# Patient Record
Sex: FEMALE | Race: WHITE | Employment: FULL TIME | URBAN - METROPOLITAN AREA
[De-identification: names, ages, dates, MRNs, and addresses within clinical notes are randomized per-mention and may not be internally consistent; named-entity substitution may affect disease eponyms.]

---

## 2017-01-22 ENCOUNTER — HOSPITAL ENCOUNTER (OUTPATIENT)
Age: 47
Discharge: HOME | End: 2017-01-22
Attending: EMERGENCY MEDICINE
Payer: COMMERCIAL

## 2017-01-22 VITALS
OXYGEN SATURATION: 100 % | RESPIRATION RATE: 18 BRPM | TEMPERATURE: 98 F | HEIGHT: 66 IN | DIASTOLIC BLOOD PRESSURE: 102 MMHG | WEIGHT: 170 LBS | HEART RATE: 73 BPM | SYSTOLIC BLOOD PRESSURE: 148 MMHG | BODY MASS INDEX: 27.32 KG/M2

## 2017-01-22 DIAGNOSIS — J01.01 ACUTE RECURRENT MAXILLARY SINUSITIS: Primary | ICD-10-CM

## 2017-01-22 DIAGNOSIS — H92.22 EAR BLEEDING, LEFT: ICD-10-CM

## 2017-01-22 PROCEDURE — 99213 OFFICE O/P EST LOW 20 MIN: CPT

## 2017-01-22 PROCEDURE — 99204 OFFICE O/P NEW MOD 45 MIN: CPT

## 2017-01-22 RX ORDER — AMOXICILLIN AND CLAVULANATE POTASSIUM 875; 125 MG/1; MG/1
1 TABLET, FILM COATED ORAL 2 TIMES DAILY
Qty: 20 TABLET | Refills: 0 | Status: SHIPPED | OUTPATIENT
Start: 2017-01-22 | End: 2017-02-01

## 2017-01-22 RX ORDER — NEOMYCIN SULFATE, POLYMYXIN B SULFATE, HYDROCORTISONE 3.5; 10000; 1 MG/ML; [USP'U]/ML; MG/ML
4 SOLUTION/ DROPS AURICULAR (OTIC)
Qty: 10 ML | Refills: 0 | Status: SHIPPED | OUTPATIENT
Start: 2017-01-22 | End: 2017-01-29

## 2017-01-22 NOTE — ED INITIAL ASSESSMENT (HPI)
C/o left ear bleeding this am  Has had uri sx x 1 week   Head and nasal congestion   Some facial pain

## 2017-01-22 NOTE — ED PROVIDER NOTES
Patient Seen in: [facility] In [address] [place] [address]    History   Patient presents with:  Cough/URI    Stated Complaint: ear bleeding    HPI    42year old female with past medical history of hypothyroidism, recurrent sinusitis s/p right sinus s needed. Apply Ointment to area or rash 2-3 times/day after washing the area. Mometasone Furoate (NASONEX) 50 MCG/ACT Nasal Suspension,  1 spray by Nasal route daily as needed.        Family History   Problem Relation Age of Onset   • Hypertension Mother and reactive to light. Neck: Normal range of motion. Neck supple. Cardiovascular: Normal rate and intact distal pulses. Pulmonary/Chest: Effort normal. No respiratory distress. Musculoskeletal: Normal range of motion. She exhibits no tenderness.

## 2017-03-21 ENCOUNTER — OFFICE VISIT (OUTPATIENT)
Dept: INTERNAL MEDICINE CLINIC | Facility: CLINIC | Age: 47
End: 2017-03-21

## 2017-03-21 VITALS
HEIGHT: 66 IN | SYSTOLIC BLOOD PRESSURE: 120 MMHG | DIASTOLIC BLOOD PRESSURE: 86 MMHG | HEART RATE: 76 BPM | WEIGHT: 175 LBS | BODY MASS INDEX: 28.13 KG/M2 | TEMPERATURE: 98 F

## 2017-03-21 DIAGNOSIS — J01.00 ACUTE NON-RECURRENT MAXILLARY SINUSITIS: Primary | ICD-10-CM

## 2017-03-21 DIAGNOSIS — J06.9 VIRAL UPPER RESPIRATORY TRACT INFECTION: ICD-10-CM

## 2017-03-21 PROCEDURE — 99214 OFFICE O/P EST MOD 30 MIN: CPT | Performed by: INTERNAL MEDICINE

## 2017-03-21 PROCEDURE — 99212 OFFICE O/P EST SF 10 MIN: CPT | Performed by: INTERNAL MEDICINE

## 2017-03-21 RX ORDER — AZITHROMYCIN 250 MG/1
TABLET, FILM COATED ORAL
Qty: 6 TABLET | Refills: 0 | Status: SHIPPED | OUTPATIENT
Start: 2017-03-21 | End: 2017-09-01

## 2017-03-21 NOTE — PROGRESS NOTES
HPI:   Jane Hrenandez is a 41year old female who presents for complains of: Patient presents with:  Cough: since saturday started with cough, now head congestion also , low grade 99.5, 2 days now, sick contact at home, head congestion and body aches, i infection  As above, call if you are not getting better with the plan above.         Livan Cervantes,   3/21/2017  11:58 AM

## 2017-03-21 NOTE — PATIENT INSTRUCTIONS
1. Acute non-recurrent maxillary sinusitis  Use the abx if getting worse  Use either Mucinex D, Robitussin D to help with his symptoms over the next few days along with something to help with fever such as ibuprofen or Tylenol.  - azithromycin (ZITHROMAX Z

## 2017-04-01 ENCOUNTER — PATIENT MESSAGE (OUTPATIENT)
Dept: INTERNAL MEDICINE CLINIC | Facility: CLINIC | Age: 47
End: 2017-04-01

## 2017-04-02 NOTE — TELEPHONE ENCOUNTER
From: Adalberto Cai  To: Nicolette Mejia DO  Sent: 4/1/2017 5:09 PM CDT  Subject: Other    Hi Dr Alan Olivera!  Sanaz Shine has finally agreed to coming to see you! We want to come Friday April 14th. ...he wants to do any blood work etc there at your office

## 2017-05-05 DIAGNOSIS — E03.1 CONGENITAL HYPOTHYROIDISM WITHOUT GOITER: Primary | ICD-10-CM

## 2017-05-05 NOTE — TELEPHONE ENCOUNTER
Last refilled 11/18/16 for a year supply to [facility]. Does pt want Rx sent to [facility] now?  LMTCB

## 2017-05-05 NOTE — TELEPHONE ENCOUNTER
Pt doesn't need a refill at this time. She was trying to transfer the prescription from [facility] to [address] [place] [address], so they have it when she needs the refill.                    Tasked to rx

## 2017-05-10 RX ORDER — LEVOTHYROXINE SODIUM 0.05 MG/1
50 TABLET ORAL
Qty: 90 TABLET | Refills: 3 | OUTPATIENT
Start: 2017-05-10

## 2017-08-07 PROBLEM — R60.0 LEG EDEMA, LEFT: Status: ACTIVE | Noted: 2017-08-07

## 2017-09-01 PROCEDURE — 87480 CANDIDA DNA DIR PROBE: CPT | Performed by: OBSTETRICS & GYNECOLOGY

## 2017-09-01 PROCEDURE — 87660 TRICHOMONAS VAGIN DIR PROBE: CPT | Performed by: OBSTETRICS & GYNECOLOGY

## 2017-09-01 PROCEDURE — 87510 GARDNER VAG DNA DIR PROBE: CPT | Performed by: OBSTETRICS & GYNECOLOGY

## 2017-09-06 ENCOUNTER — PATIENT MESSAGE (OUTPATIENT)
Dept: INTERNAL MEDICINE CLINIC | Facility: CLINIC | Age: 47
End: 2017-09-06

## 2017-09-06 NOTE — TELEPHONE ENCOUNTER
From: Balta Cai  To: Zaid Lloyd DO  Sent: 9/6/2017 10:47 AM CDT  Subject: Prescription Question    Dr. Suhail Caceres -   In the past, I have had my Rx for my thyroid filled at the local pharmacy.  I have enough left for now until I see you aga

## 2017-11-10 ENCOUNTER — OFFICE VISIT (OUTPATIENT)
Dept: INTERNAL MEDICINE CLINIC | Facility: CLINIC | Age: 47
End: 2017-11-10

## 2017-11-10 VITALS
TEMPERATURE: 98 F | HEIGHT: 65 IN | DIASTOLIC BLOOD PRESSURE: 86 MMHG | BODY MASS INDEX: 29.89 KG/M2 | HEART RATE: 79 BPM | OXYGEN SATURATION: 98 % | WEIGHT: 179.38 LBS | SYSTOLIC BLOOD PRESSURE: 110 MMHG

## 2017-11-10 DIAGNOSIS — R60.0 LEG EDEMA, LEFT: ICD-10-CM

## 2017-11-10 DIAGNOSIS — I83.90 UNCOMPLICATED VARICOSE VEINS: ICD-10-CM

## 2017-11-10 DIAGNOSIS — Z85.820 HISTORY OF MELANOMA: ICD-10-CM

## 2017-11-10 DIAGNOSIS — Z00.00 PHYSICAL EXAM: Primary | ICD-10-CM

## 2017-11-10 DIAGNOSIS — L82.1 SEBORRHEIC KERATOSIS: ICD-10-CM

## 2017-11-10 DIAGNOSIS — E03.1 CONGENITAL HYPOTHYROIDISM WITHOUT GOITER: ICD-10-CM

## 2017-11-10 DIAGNOSIS — Z85.828 HISTORY OF BASAL CELL CARCINOMA: ICD-10-CM

## 2017-11-10 PROCEDURE — 99214 OFFICE O/P EST MOD 30 MIN: CPT | Performed by: INTERNAL MEDICINE

## 2017-11-10 PROCEDURE — 99212 OFFICE O/P EST SF 10 MIN: CPT | Performed by: INTERNAL MEDICINE

## 2017-11-10 RX ORDER — LEVOTHYROXINE SODIUM 0.05 MG/1
50 TABLET ORAL
Qty: 90 TABLET | Refills: 3 | Status: SHIPPED | OUTPATIENT
Start: 2017-11-10 | End: 2018-10-18

## 2017-11-10 NOTE — PATIENT INSTRUCTIONS
1. Physical exam  Physical exam instruction: Improve diet and exercise, cont labs thru employer. 2. Uncomplicated varicose veins  Cont current treatments    3. Seborrheic keratosis  Cont current management    4.  Congenital hypothyroidism without goiter

## 2017-11-10 NOTE — PROGRESS NOTES
HPI:   Shelli Andrade is a 41year old female who presents for a complete physical exam.     Patient complains of: Patient presents with:  Physical: Brought lab work done through work, stable and doing well, exercising 5 times per week, cardio 30 min pe History   Problem Relation Age of Onset   • Hypertension Mother 54   • Stroke Father 61   • Hypertension Maternal Grandmother       Social History:   Smoking status: Never Smoker                                                              Smokeless tobacc cyanosis no edema  Skin exam: No obvious wounds, no rashes  Neurological exam: Cranial nerves II through XII intact, no gross deficits  Musculoskeletal exam: no arthritis appreciated, no obvious deformity    ASSESSMENT AND PLAN:   Rian Gregory is a 41

## 2017-12-07 ENCOUNTER — TELEPHONE (OUTPATIENT)
Dept: INTERNAL MEDICINE CLINIC | Facility: CLINIC | Age: 47
End: 2017-12-07

## 2017-12-08 NOTE — TELEPHONE ENCOUNTER
Regarding: Visit Follow-up Question  Contact: [phone]  ----- Message from Javon Ivory RN sent at 12/5/2017  7:49 AM CST -----       ----- Message from Kitty Haider to Yesika St DO sent at 12/4/2017  8:00 AM -----   Hi Dr. Liss Pulido

## 2017-12-08 NOTE — TELEPHONE ENCOUNTER
Blanca Valle when you are back in town can you take a look, I am okay with changing this over to a [id number] or [id number] (if this meets criteria) if this helps her.

## 2018-01-12 ENCOUNTER — HOSPITAL ENCOUNTER (OUTPATIENT)
Age: 48
Discharge: HOME | End: 2018-01-12
Attending: PEDIATRICS
Payer: COMMERCIAL

## 2018-01-12 VITALS
HEIGHT: 65 IN | TEMPERATURE: 99 F | DIASTOLIC BLOOD PRESSURE: 94 MMHG | OXYGEN SATURATION: 99 % | RESPIRATION RATE: 16 BRPM | SYSTOLIC BLOOD PRESSURE: 151 MMHG | WEIGHT: 180 LBS | HEART RATE: 87 BPM | BODY MASS INDEX: 29.99 KG/M2

## 2018-01-12 DIAGNOSIS — J06.9 UPPER RESPIRATORY TRACT INFECTION, UNSPECIFIED TYPE: Primary | ICD-10-CM

## 2018-01-12 PROCEDURE — 99214 OFFICE O/P EST MOD 30 MIN: CPT

## 2018-01-12 PROCEDURE — 99213 OFFICE O/P EST LOW 20 MIN: CPT

## 2018-01-12 RX ORDER — CODEINE PHOSPHATE AND GUAIFENESIN 10; 100 MG/5ML; MG/5ML
5 SOLUTION ORAL EVERY 6 HOURS PRN
Qty: 120 ML | Refills: 0 | Status: SHIPPED | OUTPATIENT
Start: 2018-01-12 | End: 2018-03-06

## 2018-01-12 NOTE — ED PROVIDER NOTES
Patient presents with:  Cough/URI  Headache  Fever      HPI:     Sienna Bright is a 41year old female who presents for evaluation of a chief complaint of upper respiratory symptoms for 3 days.   Patient states she has had cough, congestion, runny nose, AVS for detailed discharge instructions for your condition today.     Follow Up with:  Annamarie Whyte DO  [facility] [address] [address] [phone]      As needed

## 2018-01-12 NOTE — ED INITIAL ASSESSMENT (HPI)
Pt reporting cough, chest congestion, sinus pressure and low grade fever for past 3 days. Temp .  Denies N/V/D

## 2018-01-16 ENCOUNTER — OFFICE VISIT (OUTPATIENT)
Dept: INTERNAL MEDICINE CLINIC | Facility: CLINIC | Age: 48
End: 2018-01-16

## 2018-01-16 VITALS
OXYGEN SATURATION: 98 % | DIASTOLIC BLOOD PRESSURE: 90 MMHG | BODY MASS INDEX: 30.35 KG/M2 | SYSTOLIC BLOOD PRESSURE: 130 MMHG | TEMPERATURE: 98 F | WEIGHT: 182.19 LBS | HEIGHT: 65 IN | HEART RATE: 80 BPM

## 2018-01-16 DIAGNOSIS — R03.0 ELEVATED BLOOD PRESSURE READING: ICD-10-CM

## 2018-01-16 DIAGNOSIS — J06.9 ACUTE UPPER RESPIRATORY INFECTION, UNSPECIFIED: Primary | ICD-10-CM

## 2018-01-16 PROCEDURE — 99212 OFFICE O/P EST SF 10 MIN: CPT | Performed by: INTERNAL MEDICINE

## 2018-01-16 PROCEDURE — 99213 OFFICE O/P EST LOW 20 MIN: CPT | Performed by: INTERNAL MEDICINE

## 2018-01-16 NOTE — PROGRESS NOTES
Adry Sun is a 41year old female. HPI:   Patient presents with:  Cough: Fever last week, chest congestion, productive cough, sinus pressure, headache x 5 days   Note: Needs a note for work        As per nursing documentation.   Patient relates tonya Disp:  Rfl:    Mometasone Furoate (NASONEX) 50 MCG/ACT Nasal Suspension 1 spray by Nasal route daily as needed.  Disp:  Rfl:       Past Medical History:   Diagnosis Date   • Allergic rhinitis Spring 2013   • Basal cell adenocarcinoma    • [facility], talked about getting a chest x-ray but both of us believe that this is not necessary. Also both of us agree no antibiotics are needed. I did give patient a release to go back to work. I do not believe she is infectious.   Patient will call if her coughin

## 2018-01-22 ENCOUNTER — PATIENT MESSAGE (OUTPATIENT)
Dept: INTERNAL MEDICINE CLINIC | Facility: CLINIC | Age: 48
End: 2018-01-22

## 2018-01-22 NOTE — TELEPHONE ENCOUNTER
From: Lise Holsteinjames Cai  To: Kendal Warner DO  Sent: 1/22/2018 9:45 AM CST  Subject: Visit Follow-up Question    Hi!   As you can see from my history - I was at the urgent care on Jan 12 and had a follow-up visit with Dr. Rigo Cox (sp?) on Tuesday, J

## 2018-01-23 RX ORDER — CEFUROXIME AXETIL 500 MG/1
500 TABLET ORAL 2 TIMES DAILY
Qty: 20 TABLET | Refills: 0 | Status: SHIPPED | OUTPATIENT
Start: 2018-01-23 | End: 2018-03-06 | Stop reason: ALTCHOICE

## 2018-01-23 NOTE — TELEPHONE ENCOUNTER
See my chart message above, prescribed cough syrup by the urgent care, antibiotics added, I did send her back a my chart message, no call needed per

## 2018-03-05 ENCOUNTER — TELEPHONE (OUTPATIENT)
Dept: INTERNAL MEDICINE CLINIC | Facility: CLINIC | Age: 48
End: 2018-03-05

## 2018-03-05 NOTE — TELEPHONE ENCOUNTER
Pt had \"a pretty severe constipation last week that resulted in a hemorrhoid\". She has been treating it with OTC cream. She noticed bleeding. She had this happen about 20 years ago, and she had to have a blood clot removed.  Pt scheduled an appt with

## 2018-03-05 NOTE — TELEPHONE ENCOUNTER
Called patient per Dr. Jose Middleton try sitz baths 3 times daily and use miralax for constipation. She will see Dr. Catalina Waterman tomorrow.

## 2018-03-06 ENCOUNTER — OFFICE VISIT (OUTPATIENT)
Dept: INTERNAL MEDICINE CLINIC | Facility: CLINIC | Age: 48
End: 2018-03-06

## 2018-03-06 VITALS
WEIGHT: 181.63 LBS | OXYGEN SATURATION: 98 % | BODY MASS INDEX: 30.26 KG/M2 | TEMPERATURE: 98 F | HEART RATE: 91 BPM | HEIGHT: 65 IN | DIASTOLIC BLOOD PRESSURE: 90 MMHG | SYSTOLIC BLOOD PRESSURE: 140 MMHG

## 2018-03-06 DIAGNOSIS — K64.5 THROMBOSED EXTERNAL HEMORRHOID: Primary | ICD-10-CM

## 2018-03-06 PROCEDURE — 99213 OFFICE O/P EST LOW 20 MIN: CPT | Performed by: INTERNAL MEDICINE

## 2018-03-06 PROCEDURE — 99212 OFFICE O/P EST SF 10 MIN: CPT | Performed by: INTERNAL MEDICINE

## 2018-03-06 NOTE — PROGRESS NOTES
Adry Sun is a 57year old female. Patient presents with:  Hemorrhoids: Has been using prep H for about a week with no relief. HPI:     Pt thinks she has a hemorrhoid. Had a thrombosed hemorrhoid removed about 20 years ago.   Since then, she has well otherwise  RESPIRATORY: no SOB  CARDIOVASCULAR: no chest pain/pressure  GI: no nausea, vomiting, diarrhea    Wt Readings from Last 5 Encounters:  03/06/18 : 181 lb 9.6 oz (82.4 kg)  01/16/18 : 182 lb 3.2 oz (82.6 kg)  01/12/18 : 180 lb (81.6 kg)  11/1

## 2018-03-22 ENCOUNTER — LAB ENCOUNTER (OUTPATIENT)
Dept: LAB | Age: 48
End: 2018-03-22
Attending: PODIATRIST
Payer: COMMERCIAL

## 2018-03-22 ENCOUNTER — HOSPITAL ENCOUNTER (OUTPATIENT)
Dept: GENERAL RADIOLOGY | Age: 48
Discharge: HOME | End: 2018-03-22
Attending: PODIATRIST
Payer: COMMERCIAL

## 2018-03-22 DIAGNOSIS — M72.2 PLANTAR FASCIITIS: ICD-10-CM

## 2018-03-22 DIAGNOSIS — Z01.818 PRE-OP TESTING: Primary | ICD-10-CM

## 2018-03-22 DIAGNOSIS — Z01.818 PRE-OP EXAM: ICD-10-CM

## 2018-03-22 LAB
ANION GAP SERPL CALC-SCNC: 5 MMOL/L (ref 0–18)
BASOPHILS # BLD: 0.1 K/UL (ref 0–0.2)
BASOPHILS NFR BLD: 1 %
BUN SERPL-MCNC: 13 MG/DL (ref 8–20)
BUN/CREAT SERPL: 11.9 (ref 10–20)
CALCIUM SERPL-MCNC: 8.6 MG/DL (ref 8.5–10.5)
CHLORIDE SERPL-SCNC: 106 MMOL/L (ref 95–110)
CO2 SERPL-SCNC: 28 MMOL/L (ref 22–32)
CREAT SERPL-MCNC: 1.09 MG/DL (ref 0.5–1.5)
EOSINOPHIL # BLD: 0.2 K/UL (ref 0–0.7)
EOSINOPHIL NFR BLD: 3 %
ERYTHROCYTE [DISTWIDTH] IN BLOOD BY AUTOMATED COUNT: 13.6 % (ref 11–15)
GLUCOSE SERPL-MCNC: 95 MG/DL (ref 70–99)
HCT VFR BLD AUTO: 41.7 % (ref 35–48)
HGB BLD-MCNC: 14 G/DL (ref 12–16)
LYMPHOCYTES # BLD: 1.8 K/UL (ref 1–4)
LYMPHOCYTES NFR BLD: 28 %
MCH RBC QN AUTO: 32.7 PG (ref 27–32)
MCHC RBC AUTO-ENTMCNC: 33.7 G/DL (ref 32–37)
MCV RBC AUTO: 97 FL (ref 80–100)
MONOCYTES # BLD: 0.5 K/UL (ref 0–1)
MONOCYTES NFR BLD: 7 %
NEUTROPHILS # BLD AUTO: 3.9 K/UL (ref 1.8–7.7)
NEUTROPHILS NFR BLD: 61 %
OSMOLALITY UR CALC.SUM OF ELEC: 288 MOSM/KG (ref 275–295)
PLATELET # BLD AUTO: 173 K/UL (ref 140–400)
PMV BLD AUTO: 11.2 FL (ref 7.4–10.3)
POTASSIUM SERPL-SCNC: 4.3 MMOL/L (ref 3.3–5.1)
RBC # BLD AUTO: 4.3 M/UL (ref 3.7–5.4)
SODIUM SERPL-SCNC: 139 MMOL/L (ref 136–144)
WBC # BLD AUTO: 6.4 K/UL (ref 4–11)

## 2018-03-22 PROCEDURE — 93005 ELECTROCARDIOGRAM TRACING: CPT

## 2018-03-22 PROCEDURE — 93010 ELECTROCARDIOGRAM REPORT: CPT | Performed by: PODIATRIST

## 2018-03-22 PROCEDURE — 36415 COLL VENOUS BLD VENIPUNCTURE: CPT

## 2018-03-22 PROCEDURE — 71046 X-RAY EXAM CHEST 2 VIEWS: CPT | Performed by: PODIATRIST

## 2018-03-22 PROCEDURE — 85025 COMPLETE CBC W/AUTO DIFF WBC: CPT

## 2018-03-22 PROCEDURE — 80048 BASIC METABOLIC PNL TOTAL CA: CPT

## 2018-04-02 ENCOUNTER — PATIENT MESSAGE (OUTPATIENT)
Dept: INTERNAL MEDICINE CLINIC | Facility: CLINIC | Age: 48
End: 2018-04-02

## 2018-04-02 NOTE — TELEPHONE ENCOUNTER
can reach out to her, try to get her on my schedule either Tuesday or Thursday and an MD approval spot, hopefully tomorrow

## 2018-04-02 NOTE — TELEPHONE ENCOUNTER
Pt. Wants to know if she needs to be seen for a surgical clearance she was just told today that she needs clearance today pt. has her labs & EKG done already.  Her surgery is scheduled for 04/06 there is no special form to complete she just needs a letter c

## 2018-04-02 NOTE — TELEPHONE ENCOUNTER
To Dr Kandace Michele see patient's mychart message and documentation from . Patient has surgery on 4/6 to repair a tear in her plantar fascia band. She was just told today that she needs a letter clearing her for surgery.  She had labs and

## 2018-04-03 ENCOUNTER — OFFICE VISIT (OUTPATIENT)
Dept: INTERNAL MEDICINE CLINIC | Facility: CLINIC | Age: 48
End: 2018-04-03

## 2018-04-03 VITALS
TEMPERATURE: 98 F | DIASTOLIC BLOOD PRESSURE: 88 MMHG | HEIGHT: 66 IN | SYSTOLIC BLOOD PRESSURE: 138 MMHG | BODY MASS INDEX: 28.93 KG/M2 | HEART RATE: 89 BPM | OXYGEN SATURATION: 98 % | WEIGHT: 180 LBS

## 2018-04-03 DIAGNOSIS — M72.2 PLANTAR FASCIITIS: ICD-10-CM

## 2018-04-03 DIAGNOSIS — Z01.818 PREOP TESTING: Primary | ICD-10-CM

## 2018-04-03 PROCEDURE — 99212 OFFICE O/P EST SF 10 MIN: CPT | Performed by: INTERNAL MEDICINE

## 2018-04-03 PROCEDURE — 99214 OFFICE O/P EST MOD 30 MIN: CPT | Performed by: INTERNAL MEDICINE

## 2018-04-03 NOTE — PATIENT INSTRUCTIONS
1. Preop testing  Preop statement: This patient is in optimal medical condition for proposed surgery, procede, pre-operative testing reviewed and no jose-operative b-blocker recommended, jose-op antibiotic per surgery    2.  Plantar fasciitis  Good luck wit

## 2018-04-03 NOTE — PROGRESS NOTES
Today's office visit note printed and Faxed to Dr. Ferny Cruz office @ [phone]. Fax confirmation received.

## 2018-04-03 NOTE — PROGRESS NOTES
HPI:   Shannon Domingo is a 57year old female who presents for a complete physical exam.     Patient complains of: Patient presents with:  Pre-Op Exam: Patient presents for pre-op exam. Patient to have heel repair on 4/6/2018 with Dr. Robinson Herrera @ [facility] status: Never Smoker                                                              Smokeless tobacco: Never Used                      Alcohol use:  Yes              Comment: Socially       REVIEW OF SYSTEMS:   REVIEW OF SYSTEMS:  Constitutional: Negative for deficits  Musculoskeletal exam: no arthritis appreciated, no obvious deformity    ASSESSMENT AND PLAN:   Olvin Ibrahim is a 57year old female who presents for a complete physical exam.   1. Preop testing  Preop statement:  This patient is in optimal me

## 2018-10-18 DIAGNOSIS — E03.1 CONGENITAL HYPOTHYROIDISM WITHOUT GOITER: ICD-10-CM

## 2018-10-19 RX ORDER — LEVOTHYROXINE SODIUM 0.05 MG/1
TABLET ORAL
Qty: 90 TABLET | Refills: 0 | Status: SHIPPED | OUTPATIENT
Start: 2018-10-19 | End: 2018-11-06

## 2018-10-19 NOTE — TELEPHONE ENCOUNTER
Refill request received for levothyroxine. Appears last TSH was 10/16. To Dr. Jay Darnell - do you want to order a TSH?

## 2018-11-06 ENCOUNTER — OFFICE VISIT (OUTPATIENT)
Dept: INTERNAL MEDICINE CLINIC | Facility: CLINIC | Age: 48
End: 2018-11-06
Payer: COMMERCIAL

## 2018-11-06 VITALS
DIASTOLIC BLOOD PRESSURE: 80 MMHG | HEART RATE: 82 BPM | OXYGEN SATURATION: 98 % | WEIGHT: 185 LBS | TEMPERATURE: 98 F | RESPIRATION RATE: 18 BRPM | HEIGHT: 66 IN | BODY MASS INDEX: 29.73 KG/M2 | SYSTOLIC BLOOD PRESSURE: 118 MMHG

## 2018-11-06 DIAGNOSIS — L82.1 SEBORRHEIC KERATOSIS: ICD-10-CM

## 2018-11-06 DIAGNOSIS — I83.90 UNCOMPLICATED VARICOSE VEINS: ICD-10-CM

## 2018-11-06 DIAGNOSIS — Z85.820 HISTORY OF MELANOMA: ICD-10-CM

## 2018-11-06 DIAGNOSIS — E03.1 CONGENITAL HYPOTHYROIDISM WITHOUT GOITER: ICD-10-CM

## 2018-11-06 DIAGNOSIS — Z85.828 HISTORY OF BASAL CELL CARCINOMA: ICD-10-CM

## 2018-11-06 DIAGNOSIS — M72.2 PLANTAR FASCIITIS: ICD-10-CM

## 2018-11-06 DIAGNOSIS — Z00.00 PHYSICAL EXAM: Primary | ICD-10-CM

## 2018-11-06 PROBLEM — R60.0 LEG EDEMA, LEFT: Status: RESOLVED | Noted: 2017-08-07 | Resolved: 2018-11-06

## 2018-11-06 PROCEDURE — 99396 PREV VISIT EST AGE 40-64: CPT | Performed by: INTERNAL MEDICINE

## 2018-11-06 RX ORDER — LEVOTHYROXINE SODIUM 0.05 MG/1
50 TABLET ORAL
Qty: 90 TABLET | Refills: 3 | Status: SHIPPED | OUTPATIENT
Start: 2018-11-06 | End: 2019-10-31

## 2018-11-06 RX ORDER — LEVOTHYROXINE SODIUM 0.05 MG/1
50 TABLET ORAL
Qty: 90 TABLET | Refills: 3 | Status: SHIPPED | OUTPATIENT
Start: 2018-11-06 | End: 2018-11-06

## 2018-11-06 NOTE — PATIENT INSTRUCTIONS
1. Physical exam  Physical exam instruction: Improve diet and exercise    2. Uncomplicated varicose veins  Cont meds and monitoring    3. Congenital hypothyroidism without goiter  Cont meds and monitoring    4.  History of basal cell carcinoma  Cont meds an

## 2018-11-06 NOTE — PROGRESS NOTES
HPI:   Huan Rhodes is a 57year old female who presents for a complete physical exam.     Patient complains of: Patient presents with:   Annual: pt in for annual px, no complaints, stable and doing well after the foot surgery for plantar fasciitis, is Hypertension Maternal Grandmother       Social History:   Social History    Tobacco Use      Smoking status: Never Smoker      Smokeless tobacco: Never Used    Alcohol use: Yes      Comment: Socially    Drug use: No       REVIEW OF SYSTEMS:   REVIEW OF SYS appreciated, no obvious deformity    ASSESSMENT AND PLAN:   Shannon Domingo is a 57year old female who presents for a complete physical exam.   1. Physical exam  Physical exam instruction: Improve diet and exercise    2.  Uncomplicated varicose veins  Co

## 2020-08-31 PROBLEM — F41.1 GENERALIZED ANXIETY DISORDER: Status: ACTIVE | Noted: 2020-08-31

## 2021-10-04 PROBLEM — E66.9 OBESITY (BMI 30-39.9): Status: ACTIVE | Noted: 2021-10-04

## 2021-10-04 PROBLEM — E88.81 METABOLIC SYNDROME: Status: ACTIVE | Noted: 2021-10-04

## (undated) NOTE — LETTER
[address]  [place] [address]  Dept: [phone]  Dept Fax: [phone]  Loc: [phone]      January 12, 2018    Patient: Nikki Gamino   Date of Visit: 1/12/2018       To Whom It May Concern:    T

## (undated) NOTE — MR AVS SNAPSHOT
[facility] [place]  [facility] [address]  [phone]               Thank you for choosing us for your health care visit with Livan Cervantes DO. We are glad to serve you and happy to provide you with this summary of your visit. azithromycin 250 MG Tabs   Take two tablets by mouth today, then one tablet daily.    Commonly known as:  ZITHROMAX Z-JUAN MANUEL           CENTRUM Tabs   Take 1 tablet by mouth daily with breakfast.           Etonogestrel-Ethinyl Estradiol 0.12-0.015 MG/24HR Ring

## (undated) NOTE — ED AVS SNAPSHOT
[facility] in [facility]  [address] [facility]    Phone:  [phone]    Fax:  [address]   MRN: [id number]    Department:  [facility] in [address] [place] [address]   Date of Visit Disclosure     Insurance plans vary and the physician(s) referred by the Immediate Care may not be covered by your plan. It is possible that the physician may not participate in your health insurance plan.   This may result in a lower benefit level being a CARE PHYSICIAN AT ONCE OR GO TO THE EMERGENCY DEPARTMENT. If you have been prescribed any medication(s), please fill your prescription right away and begin taking the medication(s) as directed.   If you believe that any of the medications or instructions - If you don’t have insurance, [facility] has partnered with [facility] to help you get signed up for insurance coverage.   [facility] [employer] is a Federal Navigator program that can help with your Affordable Care Act cover